# Patient Record
Sex: FEMALE | Race: WHITE | NOT HISPANIC OR LATINO | URBAN - METROPOLITAN AREA
[De-identification: names, ages, dates, MRNs, and addresses within clinical notes are randomized per-mention and may not be internally consistent; named-entity substitution may affect disease eponyms.]

---

## 2019-05-22 ENCOUNTER — TRANSCRIBE ORDERS (OUTPATIENT)
Dept: PHYSICAL THERAPY | Facility: CLINIC | Age: 54
End: 2019-05-22

## 2019-05-22 ENCOUNTER — TREATMENT (OUTPATIENT)
Dept: PHYSICAL THERAPY | Facility: CLINIC | Age: 54
End: 2019-05-22

## 2019-05-22 DIAGNOSIS — M54.2 PAIN, NECK: Primary | ICD-10-CM

## 2019-05-22 PROCEDURE — 97162 PT EVAL MOD COMPLEX 30 MIN: CPT | Performed by: PHYSICAL THERAPIST

## 2019-05-22 PROCEDURE — 97110 THERAPEUTIC EXERCISES: CPT | Performed by: PHYSICAL THERAPIST

## 2019-05-22 PROCEDURE — 97014 ELECTRIC STIMULATION THERAPY: CPT | Performed by: PHYSICAL THERAPIST

## 2019-05-29 ENCOUNTER — TREATMENT (OUTPATIENT)
Dept: PHYSICAL THERAPY | Facility: CLINIC | Age: 54
End: 2019-05-29

## 2019-05-29 DIAGNOSIS — M54.2 PAIN, NECK: Primary | ICD-10-CM

## 2019-05-29 PROCEDURE — 97110 THERAPEUTIC EXERCISES: CPT | Performed by: PHYSICAL THERAPIST

## 2019-05-29 PROCEDURE — 97140 MANUAL THERAPY 1/> REGIONS: CPT | Performed by: PHYSICAL THERAPIST

## 2019-05-29 PROCEDURE — 97014 ELECTRIC STIMULATION THERAPY: CPT | Performed by: PHYSICAL THERAPIST

## 2019-05-29 NOTE — PROGRESS NOTES
Physical Therapy Initial Evaluation and Plan of Care    TOTAL TIME: 85 MINUTES    Subjective Evaluation    History of Present Illness  Mechanism of injury: Injured left shoulder and left side of neck while trying to install a cabinet on the bathroom wall last week;  Had been having left sided neck pain for ~ 8 months, has been going through a lot of stress that she feels may contribute    Works as a  and looks at computer screen all day; sitting and looking at computer makes it worse    Since last week having pain with raising the right arm, although pain is slowly improving    Has some chronic headaches as well  Pain with turning neck to the left    Quality of life: fair    Pain  Current pain rating: 3  At best pain rating: 3  Quality: discomfort, dull ache and tight  Aggravating factors: overhead activity, keyboarding, movement, prolonged positioning, sleeping and outstretched reach  Progression: improved    Patient Goals  Patient goals for therapy: increased strength, independence with ADLs/IADLs, return to work, increased motion and decreased pain             Objective       Postural Observations  Seated posture: fair  Correction of posture: makes symptoms better        Palpation   Left   Tenderness of the cervical paraspinals, scalenes, sternocleidomastoid, suboccipitals and upper trapezius.     Tenderness   Cervical Spine   Tenderness in the left transverse process.     Neurological Testing     Sensation   Cervical/Thoracic   Left   Intact: light touch    Right   Intact: light touch    Reflexes   Left   Biceps (C5/C6): normal (2+)  Brachioradialis (C6): normal (2+)  Triceps (C7): normal (2+)    Right   Biceps (C5/C6): normal (2+)  Brachioradialis (C6): normal (2+)  Triceps (C7): normal (2+)    Active Range of Motion   Cervical/Thoracic Spine   Cervical    Flexion: WFL  Extension: WFL  Left lateral flexion: 30 degrees with pain  Right lateral flexion: 40 degrees with pain  Left rotation: 60  degrees with pain  Right rotation: 75 degrees with pain    Strength/Myotome Testing   Cervical Spine     Left   Normal strength    Right   Normal strength    Left Shoulder     Planes of Motion   Flexion: 4   Abduction: 4   External rotation at 0°: 4     Left Elbow   Flexion: 5  Extension: 5    Left Wrist/Hand   Wrist extension: 5  Wrist flexion: 5    Tests   Cervical     Left   Positive Spurling's sign.   Negative cervical distraction.     Left Shoulder   Negative active compression ([place]).          Assessment & Plan     Assessment  Impairments: abnormal or restricted ROM, activity intolerance, impaired physical strength, lacks appropriate home exercise program and pain with function  Assessment details: S/S consistent with left shoulder and cervical strain; some chronic postural/ergonomic issues at work may be contributing to chronic neck pain; RTC strain after lifting the cabinet last week increased pain in left side of neck as well; needs PT for manual therapy, ROM, therex, strengthening, modalities and education  Prognosis: fair  Functional Limitations: carrying objects, lifting, sleeping, pulling, pushing, uncomfortable because of pain, moving in bed, sitting, reaching behind back, reaching overhead and unable to perform repetitive tasks  Goals  Plan Goals: 3 weeks:  1. IND with HEP  2. Full ROM of cervical spine and left UE without pain  3. Patient able to display 5/5 MMT strength of left UE  4. Patient able to perform ADL's, sleep, perform work duties without pain or dysfunction    Plan  Therapy options: will be seen for skilled physical therapy services  Planned modality interventions: iontophoresis, TENS, thermotherapy (hydrocollator packs), traction, ultrasound, high voltage pulsed current (pain management), electrical stimulation/Russian stimulation and cryotherapy  Planned therapy interventions: body mechanics training, flexibility, functional ROM exercises, home exercise program, joint mobilization,  manual therapy, neuromuscular re-education, postural training, soft tissue mobilization, spinal/joint mobilization, strengthening, stretching and therapeutic activities  Frequency: 2x week  Duration in visits: 6  Treatment plan discussed with: patient        Manual Therapy:         mins  12892;  Therapeutic Exercise:    30     mins  29128;     Neuromuscular Liliya:        mins  18373;    Therapeutic Activity:          mins  31743;     Gait Training:           mins  91650;     Ultrasound:          mins  83424;    Electrical Stimulation:    15     mins  60458 ( );  Dry Needling          mins self-pay    Timed Treatment:   45   mins   Total Treatment:     85   mins    PT SIGNATURE: Fredy Castro, MANUEL   DATE TREATMENT INITIATED: 5/29/2019    Initial Certification  Certification Period: 8/27/2019  I certify that the therapy services are furnished while this patient is under my care.  The services outlined above are required by this patient, and will be reviewed every 90 days.     PHYSICIAN: Eugenia Vargas MD      DATE:     Please sign and return via fax to  .. Thank you, [facility].

## 2019-05-31 ENCOUNTER — TREATMENT (OUTPATIENT)
Dept: PHYSICAL THERAPY | Facility: CLINIC | Age: 54
End: 2019-05-31

## 2019-05-31 DIAGNOSIS — M54.2 PAIN, NECK: Primary | ICD-10-CM

## 2019-05-31 PROCEDURE — 97014 ELECTRIC STIMULATION THERAPY: CPT | Performed by: PHYSICAL THERAPIST

## 2019-05-31 PROCEDURE — 97110 THERAPEUTIC EXERCISES: CPT | Performed by: PHYSICAL THERAPIST

## 2019-05-31 PROCEDURE — 97140 MANUAL THERAPY 1/> REGIONS: CPT | Performed by: PHYSICAL THERAPIST

## 2019-05-31 NOTE — PROGRESS NOTES
Physical Therapy Daily Progress Note    TOTAL TIME: 75 MINUTES    Amberly Grant reports: feels a little better; shoulder much better, neck still a little stiff with turning head; off work this week so that has helped some      Objective   See Exercise, Manual, and Modality Logs for complete treatment.     THERAPEUTIC EXERCISES/ACTIVITIES ADDED TODAY: see flow sheets  UBE, pulleys    Manual cervical mobilizations, traction, PA mobs x 15 min    Assessment/Plan  PATIENT NEEDS CONTINUED PHYSICAL THERAPY IN ORDER TO ACHIEVE FULL ROM, STRENGTH AND FUNCTION WITH NO REPORTS OF PAIN IN ORDER TO RTW WITHOUT RESTRICTIONS AND WITHOUT PAIN OR DYSFUNCTION       Progress per Plan of Care           Manual Therapy:    15     mins  63970;  Therapeutic Exercise:    30     mins  29575;     Neuromuscular Liliya:        mins  69081;    Therapeutic Activity:          mins  23351;     Gait Training:           mins  95544;     Ultrasound:          mins  01776;    Electrical Stimulation:    15     mins  22663 ( );  Dry Needling          mins self-pay    Timed Treatment:   60   mins   Total Treatment:     75   mins    Fredy Castro, PT  Physical Therapist

## 2019-05-31 NOTE — PROGRESS NOTES
Physical Therapy Daily Progress Note    TOTAL TIME: 75 MINUTES    Amberly Juanita reports: feels better, still with some tension in left side of neck      Objective   See Exercise, Manual, and Modality Logs for complete treatment.     THERAPEUTIC EXERCISES/ACTIVITIES ADDED TODAY: shoulder tband series, cervical isometrics    Assessment/Plan  Making good progress; needs moderate verbal cues to perform exercises correctly; tender to palpate left side of cervical p/s mm; responds well to manual therapy    Progress per Plan of Care           Manual Therapy:    15     mins  30642;  Therapeutic Exercise:    45     mins  85870;     Neuromuscular Liliya:        mins  79062;    Therapeutic Activity:          mins  41606;     Gait Training:           mins  03258;     Ultrasound:          mins  99762;    Electrical Stimulation:    15     mins  15405 ( );  Dry Needling          mins self-pay    Timed Treatment:   75   mins   Total Treatment:     75   mins    Fredy Castro PT  Physical Therapist

## 2019-06-06 ENCOUNTER — TREATMENT (OUTPATIENT)
Dept: PHYSICAL THERAPY | Facility: CLINIC | Age: 54
End: 2019-06-06

## 2019-06-06 DIAGNOSIS — M54.2 PAIN, NECK: Primary | ICD-10-CM

## 2019-06-06 PROCEDURE — 97140 MANUAL THERAPY 1/> REGIONS: CPT | Performed by: PHYSICAL THERAPIST

## 2019-06-06 PROCEDURE — 97110 THERAPEUTIC EXERCISES: CPT | Performed by: PHYSICAL THERAPIST

## 2019-06-06 PROCEDURE — 97014 ELECTRIC STIMULATION THERAPY: CPT | Performed by: PHYSICAL THERAPIST

## 2019-06-11 NOTE — PROGRESS NOTES
Physical Therapy Daily Progress Note    TOTAL TIME: 70 MINUTES    Amberly Grant reports: feeling better; still with some tenderness on left side of neck; stress makes pain worse      Objective   See Exercise, Manual, and Modality Logs for complete treatment.     THERAPEUTIC EXERCISES/ACTIVITIES ADDED TODAY: see flow sheets     Assessment/Plan  PATIENT NEEDS CONTINUED PHYSICAL THERAPY IN ORDER TO ACHIEVE FULL ROM, STRENGTH AND FUNCTION WITH NO REPORTS OF PAIN IN ORDER TO RTW WITHOUT RESTRICTIONS AND WITHOUT PAIN OR DYSFUNCTION       Progress per Plan of Care           Manual Therapy:    15     mins  74960;  Therapeutic Exercise:    40     mins  78729;     Neuromuscular Liliya:        mins  34750;    Therapeutic Activity:          mins  73564;     Gait Training:           mins  52440;     Ultrasound:          mins  88699;    Electrical Stimulation:    15     mins  10504 ( );  Dry Needling          mins self-pay    Timed Treatment:   70   mins   Total Treatment:     70   mins    Fredy Castro PT  Physical Therapist

## 2019-06-13 ENCOUNTER — TREATMENT (OUTPATIENT)
Dept: PHYSICAL THERAPY | Facility: CLINIC | Age: 54
End: 2019-06-13

## 2019-06-13 DIAGNOSIS — M54.2 PAIN, NECK: Primary | ICD-10-CM

## 2019-06-13 PROCEDURE — 97014 ELECTRIC STIMULATION THERAPY: CPT | Performed by: PHYSICAL THERAPIST

## 2019-06-13 PROCEDURE — 97110 THERAPEUTIC EXERCISES: CPT | Performed by: PHYSICAL THERAPIST

## 2019-06-13 PROCEDURE — 97140 MANUAL THERAPY 1/> REGIONS: CPT | Performed by: PHYSICAL THERAPIST

## 2019-06-20 ENCOUNTER — TREATMENT (OUTPATIENT)
Dept: PHYSICAL THERAPY | Facility: CLINIC | Age: 54
End: 2019-06-20

## 2019-06-20 DIAGNOSIS — M54.2 PAIN, NECK: Primary | ICD-10-CM

## 2019-06-20 PROCEDURE — 97110 THERAPEUTIC EXERCISES: CPT | Performed by: PHYSICAL THERAPIST

## 2019-06-20 PROCEDURE — 97014 ELECTRIC STIMULATION THERAPY: CPT | Performed by: PHYSICAL THERAPIST

## 2019-06-20 PROCEDURE — 97140 MANUAL THERAPY 1/> REGIONS: CPT | Performed by: PHYSICAL THERAPIST

## 2019-06-21 NOTE — PROGRESS NOTES
Physical Therapy Daily Progress Note    TOTAL TIME: 60 MINUTES    Amberly Grant reports: neck is feeling a lot better, whatever we did last time seemed to really help      Objective   See Exercise, Manual, and Modality Logs for complete treatment.     THERAPEUTIC EXERCISES/ACTIVITIES ADDED TODAY: see flow sheets; cervical isometrics     Assessment/Plan  PATIENT NEEDS CONTINUED PHYSICAL THERAPY IN ORDER TO ACHIEVE FULL ROM, STRENGTH AND FUNCTION WITH NO REPORTS OF PAIN IN ORDER TO RTW WITHOUT RESTRICTIONS AND WITHOUT PAIN OR DYSFUNCTION       Progress per Plan of Care           Manual Therapy:    15     mins  53343;  Therapeutic Exercise:    30     mins  77535;     Neuromuscular Liliya:        mins  89979;    Therapeutic Activity:          mins  33526;     Gait Training:           mins  80635;     Ultrasound:          mins  97720;    Electrical Stimulation:    15     mins  12602 ( );  Dry Needling          mins self-pay    Timed Treatment:   60   mins   Total Treatment:     60   mins    Fredy Castro PT  Physical Therapist

## 2019-06-21 NOTE — PROGRESS NOTES
Physical Therapy Daily Progress Note    TOTAL TIME: 65 MINUTES    Amberly Juanita reports: neck is feeling a lot better, doing HEP      Objective   See Exercise, Manual, and Modality Logs for complete treatment.     THERAPEUTIC EXERCISES/ACTIVITIES ADDED TODAY: see flow sheets    Assessment/Plan  Patient has made very good progress to date; has made excellent ergonomic changes at work to decrease the stress/strain on her neck; doing HEP consistently; will hold therapy as she is doing very well, will call to schedule prn    Other hold therapy           Manual Therapy:    15     mins  87730;  Therapeutic Exercise:    35     mins  24747;     Neuromuscular Liliya:        mins  52506;    Therapeutic Activity:          mins  42108;     Gait Training:           mins  65901;     Ultrasound:          mins  02143;    Electrical Stimulation:    15     mins  39381 ( );  Dry Needling          mins self-pay    Timed Treatment:   65   mins   Total Treatment:     65   mins    Fredy Castro, PT  Physical Therapist